# Patient Record
Sex: MALE | Race: AMERICAN INDIAN OR ALASKA NATIVE
[De-identification: names, ages, dates, MRNs, and addresses within clinical notes are randomized per-mention and may not be internally consistent; named-entity substitution may affect disease eponyms.]

---

## 2018-01-20 ENCOUNTER — HOSPITAL ENCOUNTER (EMERGENCY)
Dept: HOSPITAL 5 - ED | Age: 38
LOS: 1 days | Discharge: HOME | End: 2018-01-21
Payer: COMMERCIAL

## 2018-01-20 VITALS — DIASTOLIC BLOOD PRESSURE: 71 MMHG | SYSTOLIC BLOOD PRESSURE: 114 MMHG

## 2018-01-20 DIAGNOSIS — J45.909: ICD-10-CM

## 2018-01-20 DIAGNOSIS — R10.31: Primary | ICD-10-CM

## 2018-01-20 LAB
ALBUMIN SERPL-MCNC: 4.3 G/DL (ref 3.9–5)
ALT SERPL-CCNC: 28 UNITS/L (ref 7–56)
BASOPHILS # (AUTO): 0 K/MM3 (ref 0–0.1)
BASOPHILS NFR BLD AUTO: 0.7 % (ref 0–1.8)
BILIRUB UR QL STRIP: (no result)
BLOOD UR QL VISUAL: (no result)
BUN SERPL-MCNC: 12 MG/DL (ref 9–20)
BUN/CREAT SERPL: 13 %
CALCIUM SERPL-MCNC: 9 MG/DL (ref 8.4–10.2)
EOSINOPHIL # BLD AUTO: 0.2 K/MM3 (ref 0–0.4)
EOSINOPHIL NFR BLD AUTO: 2.4 % (ref 0–4.3)
HCT VFR BLD CALC: 45.8 % (ref 35.5–45.6)
HEMOLYSIS INDEX: 6
HGB BLD-MCNC: 15.4 GM/DL (ref 11.8–15.2)
LYMPHOCYTES # BLD AUTO: 2 K/MM3 (ref 1.2–5.4)
LYMPHOCYTES NFR BLD AUTO: 29.6 % (ref 13.4–35)
MCH RBC QN AUTO: 32 PG (ref 28–32)
MCHC RBC AUTO-ENTMCNC: 34 % (ref 32–34)
MCV RBC AUTO: 94 FL (ref 84–94)
MONOCYTES # (AUTO): 0.6 K/MM3 (ref 0–0.8)
MONOCYTES % (AUTO): 8.5 % (ref 0–7.3)
NITRITE UR QL STRIP: (no result)
PH UR STRIP: 6 [PH] (ref 5–7)
PLATELET # BLD: 214 K/MM3 (ref 140–440)
PROT UR STRIP-MCNC: (no result) MG/DL
RBC # BLD AUTO: 4.86 M/MM3 (ref 3.65–5.03)
RBC #/AREA URNS HPF: 1 /HPF (ref 0–6)
UROBILINOGEN UR-MCNC: < 2 MG/DL (ref ?–2)
WBC #/AREA URNS HPF: < 1 /HPF (ref 0–6)

## 2018-01-20 PROCEDURE — 80053 COMPREHEN METABOLIC PANEL: CPT

## 2018-01-20 PROCEDURE — 36415 COLL VENOUS BLD VENIPUNCTURE: CPT

## 2018-01-20 PROCEDURE — 74177 CT ABD & PELVIS W/CONTRAST: CPT

## 2018-01-20 PROCEDURE — 85025 COMPLETE CBC W/AUTO DIFF WBC: CPT

## 2018-01-20 PROCEDURE — 99284 EMERGENCY DEPT VISIT MOD MDM: CPT

## 2018-01-20 PROCEDURE — 96374 THER/PROPH/DIAG INJ IV PUSH: CPT

## 2018-01-20 PROCEDURE — 81001 URINALYSIS AUTO W/SCOPE: CPT

## 2018-01-20 NOTE — EMERGENCY DEPARTMENT REPORT
HPI





- General


Chief Complaint: Abdominal Pain


Time Seen by Provider: 01/20/18 22:15





- HPI


HPI: 


This is a 37-year-old male presents to the emergency department from home with 

complaint of a 3 day history of some right sided abdominal pain that appears to 

be worst in the right lower quadrant.  It is associated with some nausea 

without vomiting.  He denies any fever, diarrhea, constipation, dysuria, 

discharge.  He took some Tylenol for his pain without much relief.  The pain 

increases with movement.  He has a past medical history of asthma.  He does not 

have a primary care physician.  No recent travel or sick contacts at home.








ED Past Medical Hx





- Past Medical History


Hx Asthma: Yes





- Surgical History


Past Surgical History?: No





- Social History


Smoking Status: Never Smoker


Substance Use Type: Alcohol





- Medications


Home Medications: 


 Home Medications











 Medication  Instructions  Recorded  Confirmed  Last Taken  Type


 


Ibuprofen [Motrin] 600 mg PO Q8H PRN 5 Days #15 tablet 12/01/17  Unknown Rx


 


HYDROcodone/ACETAMINOPHEN [Norco 1 each PO Q8H PRN #8 tablet 01/21/18  Unknown 

Rx





5-325 Tablet]     














ED Review of Systems


ROS: 


Stated complaint: ABDOMINAL PAIN


Other details as noted in HPI





Comment: All other systems reviewed and negative


Constitutional: denies: chills, fever


Eyes: denies: eye pain, eye discharge, vision change


ENT: denies: ear pain, throat pain


Respiratory: denies: cough, shortness of breath, wheezing


Cardiovascular: denies: chest pain, palpitations


Gastrointestinal: abdominal pain, nausea.  denies: vomiting


Genitourinary: denies: urgency, dysuria


Musculoskeletal: denies: back pain, joint swelling, arthralgia


Skin: denies: rash, lesions


Neurological: denies: headache, weakness, paresthesias





Physical Exam





- Physical Exam


Vital Signs: 


 Vital Signs











  01/20/18 01/20/18





  19:58 21:57


 


Temperature 98.6 F 98.9 F


 


Pulse Rate 89 78


 


Respiratory 14 18





Rate  


 


Blood Pressure 119/70 


 


Blood Pressure  114/71





[Right]  


 


O2 Sat by Pulse 96 97





Oximetry  











Physical Exam: 


GENERAL: The patient is well-developed well-nourished.


HENT: Normocephalic.  Atraumatic.    Patient has moist mucous membranes.


EYES: Extraocular motions are intact.  Pupils equal reactive to light 

bilaterally.


NECK: Supple.  Trachea is midline.


CHEST/LUNGS: Clear to auscultation.  There is no respiratory distress noted.


HEART/CARDIOVASCULAR: Regular.  There is no tachycardia.  There is no murmur.


ABDOMEN: Abdomen is soft there is some tenderness to palpation to the right 

upper and lower quadrants.  No guarding or rebound tenderness..  Patient has 

normal bowel sounds.  There is no abdominal distention.


SKIN: Skin is warm and dry.


NEURO: The patient is awake, alert, and oriented.  The patient is cooperative.  

The patient has no focal neurologic deficits.  The patient has normal speech.


MUSCULOSKELETAL: There is no tenderness or deformity.  There is no limitation 

range of motion.  There is no evidence of acute injury.








ED Course


 Vital Signs











  01/20/18 01/20/18





  19:58 21:57


 


Temperature 98.6 F 98.9 F


 


Pulse Rate 89 78


 


Respiratory 14 18





Rate  


 


Blood Pressure 119/70 


 


Blood Pressure  114/71





[Right]  


 


O2 Sat by Pulse 96 97





Oximetry  














ED Medical Decision Making





- Lab Data


Result diagrams: 


 01/20/18 20:25





 01/20/18 20:25





- Radiology Data


Radiology results: report reviewed





PROCEDURE: CT ABDOMEN PELVIS W CON 





TECHNIQUE: Computerized axial tomography of the abdomen and 


pelvis was performed after the IV injection of iodinated nonionic 


contrast. 





HISTORY: Abd pain 





COMPARISON: No prior studies are available for comparison. 





FINDINGS: 


Visualized lower thorax: No significant abnormality. 





Liver: Normal size and attenuation. 





Spleen: Normal size and attenuation. 





Gallbladder and biliary system: Normal. 





Pancreas: Normal. 





Adrenals: Normal. 





Kidneys: Both kidneys have a normal size. No hydronephrosis. No 


renal stones or masses. 





GI tract: The stomach is normal. The small bowel has a normal 


caliber without obstruction. No ileus or enteritis. The cecum, 


appendix and colon are normal.. 





Lymph nodes and mesentery: Normal. 





Vasculature: Normal. 





Bladder: Normal. 





Reproductive organs: Normal. 





Peritoneum: No free fluid. 





Musculoskeletal structures: No significant abnormality. 





Other: None. 





IMPRESSION: 


There is no evidence of an acute intestinal or urinary tract 


obstruction. No ileus or enteritis. The appendix is normal. 











Transcribed By: [facility] 


Dictated By: WILEY SHETH MD 


Electronically Authenticated By: WILEY SHETH MD 


Signed Date/Time: 01/20/18 2058 





- Medical Decision Making


Patient presents with a few days of right-sided abdominal pain.  Labs and 

unremarkable.  CT of the abdomen and pelvis with IV contrast does not show any 

acute process or etiology of his symptoms.  He was given some fluid and a dose 

of pain medication and upon reevaluation is feeling improved.  Vital signs 

stable throughout his ED course.  He says he has good follow-up with a primary 

care physician.  He will be discharged home with a very small amount of pain 

medication and encouragement to see his PCP.  He will return to the ER with any 

worsening of symptoms or any acute distress.








- Differential Diagnosis


appendicitis, colitis, diverticulitis, gastroenteritis


Critical care attestation.: 


If time is entered above; I have spent that time in minutes in the direct care 

of this critically ill patient, excluding procedure time.








ED Disposition


Clinical Impression: 


Abdominal pain


Qualifiers:


 Abdominal location: unspecified location Qualified Code(s): R10.9 - 

Unspecified abdominal pain





Disposition: DC-01 TO HOME OR SELFCARE


Is pt being admited?: No


Condition: Stable


Instructions:  Abdominal Pain (ED)


Additional Instructions: 


Please follow-up with your primary care physician in the next few days.  Return 

to the emergency Department with any worsening of your symptoms or any acute 

distress. You have been prescribed a medication that is sedating and therefore 

should not be taken prior to driving, working, and responsible for children and 

in no way should be mixed with alcohol of any quantity.


Prescriptions: 


HYDROcodone/ACETAMINOPHEN [Norco 5-325 Tablet] 1 each PO Q8H PRN #8 tablet


 PRN Reason: Pain


Referrals: 


PRIMARY CARE,MD [Primary Care Provider] - ASAP


Time of Disposition: 01:12

## 2018-01-21 NOTE — CAT SCAN REPORT
FINAL REPORT



PROCEDURE:  CT ABDOMEN PELVIS W CON



TECHNIQUE:  Computerized axial tomography of the abdomen and

pelvis was performed after the IV injection of iodinated nonionic

contrast. 



HISTORY:  Abd pain 



COMPARISON:  No prior studies are available for comparison.



FINDINGS:  

Visualized lower thorax: No significant abnormality.



Liver: Normal size and attenuation.



Spleen: Normal size and attenuation.



Gallbladder and biliary system: Normal.



Pancreas: Normal.



Adrenals: Normal.



Kidneys: Both kidneys have a normal size. No hydronephrosis. No

renal stones or masses.



GI tract: The stomach is normal. The small bowel has a normal

caliber without obstruction. No ileus or enteritis. The cecum,

appendix and colon are normal..



Lymph nodes and mesentery: Normal. 



Vasculature: Normal.



Bladder: Normal.



Reproductive organs: Normal.



Peritoneum: No free fluid.



Musculoskeletal structures: No significant abnormality.



Other: None.  



IMPRESSION:  

There is no evidence of an acute intestinal or urinary tract

obstruction. No ileus or enteritis. The appendix is normal.

## 2020-10-17 ENCOUNTER — HOSPITAL ENCOUNTER (EMERGENCY)
Dept: HOSPITAL 5 - ED | Age: 40
Discharge: HOME | End: 2020-10-17
Payer: SELF-PAY

## 2020-10-17 VITALS — SYSTOLIC BLOOD PRESSURE: 150 MMHG | DIASTOLIC BLOOD PRESSURE: 105 MMHG

## 2020-10-17 DIAGNOSIS — S61.211A: Primary | ICD-10-CM
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
## 2020-10-17 DIAGNOSIS — J45.909: ICD-10-CM
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
## 2020-10-17 DIAGNOSIS — Z79.899: ICD-10-CM
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
## 2020-10-17 NOTE — EMERGENCY DEPARTMENT REPORT
ED Laceration HPI





- HPI


Chief Complaint: Wound/Laceration


Stated Complaint: LEFT HAND LAC


Time Seen by Provider: 10/17/20 20:00


Occurred When: Today


Location: Upper Extremity


Severity: mild


Tetanus Status: Up to Date


Laceration Symptoms: No Foreign Body Sensation, No Numbness, No Weakness, No 

Pain


Other History: The patient was evaluated in the emergency department for 

symptoms described in the history of present illness.  He/she was evaluated in 

the context of the global COVID-19 pandemic, which necessitated consideration 

that the patient might be at risk for infection with the virus that causes 

COVID-19.  Institutional protocols and algorithms that pertain to the evaluation

of patients at risk for COVID-19 are in a state of rapid change based on 

information released by regulatory bodies including the CDC and federal and 

state organizations.  These policies and algorithms were followed during the 

patient's care in the emergency department.  Please note that these policies, 

procedures and recommendations changed on a rapid basis.  40-year-old -

American male presents to the emergency room for left index laceration from a 

knife as he was cooking.  Patient states that the knife was clean.  Did not have

much bleeding.  Reports he is up-to-date on his tetanus shot.  Bleeding is 

controlled.





ED Review of Systems


ROS: 


Stated complaint: LEFT HAND LAC


Other details as noted in HPI





Comment: All other systems reviewed and negative





ED Past Medical Hx





- Past Medical History


Previous Medical History?: Yes


Hx Asthma: Yes





- Social History


Smoking Status: Never Smoker


Substance Use Type: None





- Medications


Home Medications: 


                                Home Medications











 Medication  Instructions  Recorded  Confirmed  Last Taken  Type


 


Ibuprofen [Motrin] 600 mg PO Q8H PRN 5 Days #15 tablet 12/01/17  Unknown Rx


 


HYDROcodone/ACETAMINOPHEN [Norco 1 each PO Q8H PRN #8 tablet 01/21/18  Unknown 

Rx





5-325 Tablet]     


 


Ibuprofen [Motrin] 800 mg PO Q8HR PRN #30 tablet 11/02/18  Unknown Rx


 


Sulfamethoxazole/Trimethoprim 2 each PO BID #28 tablet 11/02/18  Unknown Rx





[Bactrim DS TAB]     














Laceration Physical Exam





- Exam


General: 


Vital signs noted. No distress. Alert and acting appropriately.





Wound Length (cm): 1


Laceration Location: Upper Extremity


Laceration Exam: Yes Normal Distal CMS, No Foreign Body, No Exposed Tendon, 

Vessel, or Nerve, No Tendon Injury





ED Course


                                   Vital Signs











  10/17/20





  15:29


 


Temperature 98.4 F


 


Pulse Rate 77


 


Respiratory 18





Rate 


 


Blood Pressure 152/102





[Right] 


 


O2 Sat by Pulse 97





Oximetry 














- Laceration /Wound Repair


  ** Left Finger


Wound Location: upper extremity (Left index finger)


Wound Length (cm): 1


Wound's Depth, Shape: into muscle


Wound Explored: no foreign body removed


Irrigated w/ Saline (ccs): 250


Betadine Prep?: Yes


Wound Repaired With: Steri-strips, Dermabond


Sterile Dressing Applied?: Yes


Progress: 





Tolerated well





ED Medical Decision Making





- Medical Decision Making





40-year-old -American male presents the emergency room for left index 

laceration from a knife as he was cooking.  Patient states that the knife was 

clean.  Did not have much bleeding.  Reports he is up-to-date on his tetanus 

shot.  Bleeding is controlled.





Index finger was soaked with water and Betadine.  Was able to use adhesive glue 

with Steri-Strips.  Instructed patient to keep wound clean and dry do not pick 

the Steri-Strips further glue.  Return if the finger looks swollen red or 

purulent discharge or comes down with a fever.  Patient verbalized understanding


Critical care attestation.: 


If time is entered above; I have spent that time in minutes in the direct care 

of this critically ill patient, excluding procedure time.








ED Disposition


Clinical Impression: 


 Laceration of index finger





Disposition: DC-01 TO HOME OR SELFCARE


Is pt being admited?: No


Does the pt Need Aspirin: No


Condition: Stable


Instructions:  Laceration (ED), Skin Adhesive Care (ED)


Additional Instructions: 


Keep wound clean and dry do not pick off Steri-Strips or glue.  Return back to 

the emergency room if there is any swelling redness or purulent discharge.  

Tylenol or ibuprofen should work with your pain.


Referrals: 


HOLDEN HU MD [Primary Care Provider] - 3-5 Days


Forms:  Work/School Release Form(ED)